# Patient Record
Sex: FEMALE | Race: WHITE | Employment: FULL TIME | URBAN - METROPOLITAN AREA
[De-identification: names, ages, dates, MRNs, and addresses within clinical notes are randomized per-mention and may not be internally consistent; named-entity substitution may affect disease eponyms.]

---

## 2019-04-08 NOTE — PROGRESS NOTES
Subjective:      Patient ID: Gerardo Tavares is a 26 y.o. female. HPI  NEW PATIENT VISIT:    Patient is here today to become established. She is feeling well and is fasting for labs. She complains of some pain in the epigastric area. It has only been present for the last couple days. She denies any heartburn or trouble swallowing. Allergies   Allergen Reactions    Nickel Rash     No current outpatient medications on file. No current facility-administered medications for this visit. Past Medical History:   Diagnosis Date    Ventricular septal defect     Compensated / She sees a Cardiologist in [facility] yearly. Past Surgical History:   Procedure Laterality Date    ADENOIDECTOMY      LASIK Bilateral      Social History     Tobacco Use    Smoking status: Former Smoker     Last attempt to quit:      Years since quittin.2    Smokeless tobacco: Never Used   Substance Use Topics    Alcohol use: Yes     Comment: Rare     Drug use: No     Family History   Problem Relation Age of Onset    Diabetes Mother     High Blood Pressure Mother     Heart Disease Maternal Aunt     Cancer Maternal Aunt     Depression Maternal Aunt         Schizophrenia         Review of Systems   Constitutional: Negative for chills and fever. HENT: Positive for sore throat. Negative for congestion and postnasal drip. Respiratory: Positive for cough. Negative for shortness of breath and wheezing. Cardiovascular: Negative for chest pain, palpitations and leg swelling. Gastrointestinal: Positive for abdominal pain. Negative for blood in stool, constipation, diarrhea, nausea and vomiting. Mild epigastric pain. Genitourinary: Negative for dysuria, frequency, hematuria and urgency. Psychiatric/Behavioral: Negative for dysphoric mood and suicidal ideas.      /80 (Cuff Size: Large Adult)   Ht 5' 4\" (1.626 m)   Wt 185 lb 9.6 oz (84.2 kg)   BMI 31.86 kg/m²    Objective:   Physical Exam

## 2019-04-12 ENCOUNTER — OFFICE VISIT (OUTPATIENT)
Dept: FAMILY MEDICINE CLINIC | Age: 34
End: 2019-04-12
Payer: COMMERCIAL

## 2019-04-12 VITALS
DIASTOLIC BLOOD PRESSURE: 80 MMHG | BODY MASS INDEX: 31.69 KG/M2 | HEIGHT: 64 IN | WEIGHT: 185.6 LBS | SYSTOLIC BLOOD PRESSURE: 120 MMHG

## 2019-04-12 DIAGNOSIS — Q21.0 VENTRICULAR SEPTAL DEFECT: ICD-10-CM

## 2019-04-12 DIAGNOSIS — Z00.00 PREVENTATIVE HEALTH CARE: ICD-10-CM

## 2019-04-12 DIAGNOSIS — Z00.00 WELL ADULT EXAM: Primary | ICD-10-CM

## 2019-04-12 LAB
ALT SERPL-CCNC: 13 U/L (ref 10–40)
ANION GAP SERPL CALCULATED.3IONS-SCNC: 13 MMOL/L (ref 3–16)
AST SERPL-CCNC: 15 U/L (ref 15–37)
BUN BLDV-MCNC: 9 MG/DL (ref 7–20)
CALCIUM SERPL-MCNC: 9.3 MG/DL (ref 8.3–10.6)
CHLORIDE BLD-SCNC: 103 MMOL/L (ref 99–110)
CHOLESTEROL, TOTAL: 149 MG/DL (ref 0–199)
CO2: 23 MMOL/L (ref 21–32)
CREAT SERPL-MCNC: 0.6 MG/DL (ref 0.6–1.1)
GFR AFRICAN AMERICAN: >60
GFR NON-AFRICAN AMERICAN: >60
GLUCOSE BLD-MCNC: 95 MG/DL (ref 70–99)
HCT VFR BLD CALC: 43.2 % (ref 36–48)
HDLC SERPL-MCNC: 64 MG/DL (ref 40–60)
HEMOGLOBIN: 14.3 G/DL (ref 12–16)
LDL CHOLESTEROL CALCULATED: 73 MG/DL
MCH RBC QN AUTO: 28.4 PG (ref 26–34)
MCHC RBC AUTO-ENTMCNC: 33.1 G/DL (ref 31–36)
MCV RBC AUTO: 85.6 FL (ref 80–100)
PDW BLD-RTO: 13.5 % (ref 12.4–15.4)
PLATELET # BLD: 334 K/UL (ref 135–450)
PMV BLD AUTO: 9.2 FL (ref 5–10.5)
POTASSIUM SERPL-SCNC: 4.7 MMOL/L (ref 3.5–5.1)
RBC # BLD: 5.05 M/UL (ref 4–5.2)
SODIUM BLD-SCNC: 139 MMOL/L (ref 136–145)
TRIGL SERPL-MCNC: 62 MG/DL (ref 0–150)
TSH SERPL DL<=0.05 MIU/L-ACNC: 1.61 UIU/ML (ref 0.27–4.2)
VLDLC SERPL CALC-MCNC: 12 MG/DL
WBC # BLD: 5.9 K/UL (ref 4–11)

## 2019-04-12 PROCEDURE — 36415 COLL VENOUS BLD VENIPUNCTURE: CPT | Performed by: FAMILY MEDICINE

## 2019-04-12 PROCEDURE — 99385 PREV VISIT NEW AGE 18-39: CPT | Performed by: FAMILY MEDICINE

## 2019-04-12 ASSESSMENT — ENCOUNTER SYMPTOMS
WHEEZING: 0
SHORTNESS OF BREATH: 0
DIARRHEA: 0
VOMITING: 0
ABDOMINAL PAIN: 1
BLOOD IN STOOL: 0
COUGH: 1
CONSTIPATION: 0
SORE THROAT: 1
NAUSEA: 0

## 2019-04-12 ASSESSMENT — PATIENT HEALTH QUESTIONNAIRE - PHQ9
SUM OF ALL RESPONSES TO PHQ QUESTIONS 1-9: 0
SUM OF ALL RESPONSES TO PHQ9 QUESTIONS 1 & 2: 0
2. FEELING DOWN, DEPRESSED OR HOPELESS: 0
1. LITTLE INTEREST OR PLEASURE IN DOING THINGS: 0
SUM OF ALL RESPONSES TO PHQ QUESTIONS 1-9: 0

## 2019-04-13 DIAGNOSIS — R73.9 HYPERGLYCEMIA: ICD-10-CM

## 2019-04-13 LAB
ESTIMATED AVERAGE GLUCOSE: 119.8 MG/DL
HBA1C MFR BLD: 5.8 %

## 2019-07-27 ENCOUNTER — HOSPITAL ENCOUNTER (EMERGENCY)
Age: 34
Discharge: HOME | End: 2019-07-27
Payer: COMMERCIAL

## 2019-07-27 VITALS
RESPIRATION RATE: 18 BRPM | BODY MASS INDEX: 31.76 KG/M2 | OXYGEN SATURATION: 98 % | SYSTOLIC BLOOD PRESSURE: 130 MMHG | TEMPERATURE: 97.6 F | WEIGHT: 185 LBS | DIASTOLIC BLOOD PRESSURE: 78 MMHG | HEART RATE: 75 BPM

## 2019-07-27 DIAGNOSIS — H10.31 ACUTE CONJUNCTIVITIS OF RIGHT EYE, UNSPECIFIED ACUTE CONJUNCTIVITIS TYPE: Primary | ICD-10-CM

## 2019-07-27 PROCEDURE — 99282 EMERGENCY DEPT VISIT SF MDM: CPT

## 2019-07-27 RX ORDER — OLOPATADINE HYDROCHLORIDE 2 MG/ML
1 SOLUTION/ DROPS OPHTHALMIC DAILY
Qty: 2.5 ML | Refills: 0 | Status: SHIPPED | OUTPATIENT
Start: 2019-07-27 | End: 2019-08-03

## 2019-07-27 RX ORDER — ERYTHROMYCIN 5 MG/G
OINTMENT OPHTHALMIC
Qty: 3.5 G | Refills: 0 | Status: SHIPPED | OUTPATIENT
Start: 2019-07-27 | End: 2019-08-06

## 2019-07-27 ASSESSMENT — ENCOUNTER SYMPTOMS
DIARRHEA: 0
SHORTNESS OF BREATH: 0
NAUSEA: 0
ABDOMINAL PAIN: 0
EYE PAIN: 0
BACK PAIN: 0
EYE DISCHARGE: 1
CHEST TIGHTNESS: 0
VOMITING: 0
PHOTOPHOBIA: 0
EYE ITCHING: 1
EYE REDNESS: 1

## 2019-07-27 ASSESSMENT — VISUAL ACUITY
OU: 20/20
OD: 20/20
OS: 20/25

## 2019-07-27 ASSESSMENT — PAIN DESCRIPTION - PAIN TYPE: TYPE: ACUTE PAIN

## 2019-07-27 ASSESSMENT — PAIN DESCRIPTION - LOCATION: LOCATION: EYE

## 2019-07-27 ASSESSMENT — PAIN DESCRIPTION - ORIENTATION: ORIENTATION: RIGHT

## 2019-07-27 NOTE — ED PROVIDER NOTES
treatment. PastMedical/Surgical History:      Diagnosis Date    Hyperglycemia     Ventricular septal defect     Compensated / She sees a Cardiologist in [facility] yearly. Procedure Laterality Date    ADENOIDECTOMY      LASIK Bilateral        Medications:  Previous Medications    No medications on file       Review of Systems:  Review of Systems   Constitutional: Negative for activity change, chills and fever. Eyes: Positive for discharge, redness and itching. Negative for photophobia, pain and visual disturbance. Respiratory: Negative for chest tightness and shortness of breath. Cardiovascular: Negative for chest pain. Gastrointestinal: Negative for abdominal pain, diarrhea, nausea and vomiting. Genitourinary: Negative for dysuria and flank pain. Musculoskeletal: Negative for back pain and myalgias. Skin: Negative for rash. Allergic/Immunologic: Positive for environmental allergies. Neurological: Negative for seizures, syncope and headaches. Positives and Pertinent negatives as per HPI. Except as noted above in the ROS, problem specific ROS was completed and is negative. Physical Exam:  Physical Exam   Constitutional: She is oriented to person, place, and time. She appears well-developed and well-nourished. She is active and cooperative. No distress. HENT:   Head: Normocephalic and atraumatic. Head is without raccoon's eyes and without Lance's sign. Right Ear: Hearing and external ear normal.   Left Ear: Hearing and external ear normal.   Nose: Nose normal.   Mouth/Throat: Oropharynx is clear and moist.   Eyes: Pupils are equal, round, and reactive to light. EOM are normal. Right eye exhibits chemosis. Right eye exhibits no discharge. Left eye exhibits no chemosis and no discharge. Right conjunctiva is injected. Right conjunctiva has no hemorrhage. Left conjunctiva is not injected. Left conjunctiva has no hemorrhage. No scleral icterus. Neck: Normal range of motion.  No JVD present. Cardiovascular: Normal rate and regular rhythm. Exam reveals no gallop and no friction rub. No murmur heard. Pulmonary/Chest: Effort normal and breath sounds normal. No accessory muscle usage. No respiratory distress. She has no wheezes. She has no rhonchi. She has no rales. Neurological: She is alert and oriented to person, place, and time. She has normal strength. No cranial nerve deficit or sensory deficit. Coordination normal. GCS eye subscore is 4. GCS verbal subscore is 5. GCS motor subscore is 6. Skin: Skin is warm and dry. She is not diaphoretic. Psychiatric: She has a normal mood and affect. Her behavior is normal.   Nursing note and vitals reviewed. MEDICAL DECISION MAKING    Vitals:    Vitals:    07/27/19 1913   BP: 130/78   Pulse: 75   Resp: 18   Temp: 97.6 °F (36.4 °C)   TempSrc: Infrared   SpO2: 98%   Weight: 185 lb (83.9 kg)       LABS:Labs Reviewed - No data to display     Remainder of labs reviewed and werenegative at this time or not returned at the time of this note. RADIOLOGY:   Non-plain film images such as CT, Ultrasound and MRI are read by the radiologist. Ruth Ann Romero PA-C have directly visualized the radiologic plain film image(s) with the below findings:      Interpretation per the Radiologist below, if available at the time of thisnote:    No orders to display        No results found. MEDICAL DECISION MAKING / ED COURSE:      PROCEDURES:   Procedures  None    Patient was given:  Medications - No data to display    The patient's detailed history of present illness is documented as above. Upon arrival to the emergency department the patient's vital signs are as documented. The patient is noted to be hemodynamically stable and afebrile. Physical examination findings are as above. Lengthy discussion took place with the patient as well as family members at bedside. There does appear to be somewhat mixed picture in regards to this.   There are definitely is an allergic component likely related to the cat but because of the associated ecchymosis and the associated injection will need to cover her also for a bacterial source. I believe that is relatively unlikely. I suggested initiation of treatment with erythromycin ophthalmic as well as Pataday. I suggested follow-up on an outpatient basis. The patient has been made aware of the signs and symptoms which would necessitate an immediate return to the emergency department and verbalizes an understanding of these signs and symptoms. I estimate there is a low risk of herpes keratitis or UV keratitis, corneal ulceration or angle closure glaucoma. There is no evidence of conjunctivitis, scleritis, or episcleritis. No vision threatening condition found such as penetrating globe injury, central retinal artery occlusion, rentinal vein thrombosis or retinal detachment. In light of a clinical presentation which lacks an opthamalogic emergency, the patient be managed on an outpatient basis. The patient/family and I have discussed the diagnosis and risks, and we agree with discharging home with referral to ophthalmology     The patient tolerated their visit well. I evaluated the patient. The physician was available for consultation as needed. The patient and / or the family were informed of the results of anytests, a time was given to answer questions, a plan was proposed and they agreed with plan. CLINICAL IMPRESSION:  1.  Acute conjunctivitis of right eye, unspecified acute conjunctivitis type        DISPOSITION Decision To Discharge 07/27/2019 07:22:22 PM      PATIENT REFERRED TO:  [facility], [address] [place] [address] Emergency Department  [address] [place] [address]  [phone]    If symptoms worsen      DISCHARGE

## 2019-07-27 NOTE — ED NOTES
Pt. discharged in stable position. Pt. provided with discharge instructions and prescriptions. Given opportunity to ask questions if needed and verbalized understanding. Pt. Ambulated to exit per self.         Rina Bah RN  07/27/19 4757

## 2019-07-30 ENCOUNTER — OFFICE VISIT (OUTPATIENT)
Dept: GYNECOLOGY | Age: 34
End: 2019-07-30
Payer: COMMERCIAL

## 2019-07-30 VITALS
WEIGHT: 171.6 LBS | OXYGEN SATURATION: 99 % | SYSTOLIC BLOOD PRESSURE: 126 MMHG | DIASTOLIC BLOOD PRESSURE: 89 MMHG | BODY MASS INDEX: 31.58 KG/M2 | HEIGHT: 62 IN | TEMPERATURE: 98.8 F | RESPIRATION RATE: 16 BRPM | HEART RATE: 74 BPM

## 2019-07-30 DIAGNOSIS — R10.2 PELVIC PAIN IN FEMALE: ICD-10-CM

## 2019-07-30 DIAGNOSIS — Z01.419 WELL WOMAN EXAM WITH ROUTINE GYNECOLOGICAL EXAM: Primary | ICD-10-CM

## 2019-07-30 PROCEDURE — 99385 PREV VISIT NEW AGE 18-39: CPT | Performed by: OBSTETRICS & GYNECOLOGY

## 2019-07-30 NOTE — PROGRESS NOTES
Subjective:      Patient ID: Mima Elmore is a 26 y.o. female. HPI   pts here for annual gyn exam.  She notes occas lower pelvic pain. Takes no meds. She's been in the [address] [place] [address],[address] for 2 years. Trying to conceive for 9 months. Review of Systems Pertinent review of systems items discussed above. All others systems items not discussed above were negative. Objective:   Physical Exam   Constitutional: She is oriented to person, place, and time. She appears well-developed and well-nourished. HENT:   Head: Normocephalic and atraumatic. Neck: No tracheal deviation present. No thyromegaly present. Cardiovascular: Normal rate, regular rhythm and normal heart sounds. No murmur heard. Pulmonary/Chest: Effort normal and breath sounds normal. No respiratory distress. She has no wheezes. She has no rales. Right breast exhibits no mass, no nipple discharge and no skin change. Left breast exhibits no mass, no nipple discharge and no skin change. No breast tenderness (no masses), discharge or bleeding. Abdominal: Soft. She exhibits no distension and no mass. There is no tenderness. There is no rebound. Genitourinary: Vagina normal and uterus normal. Rectal exam shows no external hemorrhoid. No breast tenderness (no masses), discharge or bleeding. There is no lesion on the right labia. There is no lesion on the left labia. Uterus is not deviated, not enlarged, not fixed and not tender. Cervix exhibits no motion tenderness, no discharge and no friability. Right adnexum displays no mass and no tenderness. Left adnexum displays no mass and no tenderness. No foreign body in the vagina. No vaginal discharge found. Genitourinary Comments: Pap performed. Musculoskeletal: Normal range of motion. Lymphadenopathy:     She has no cervical adenopathy. Neurological: She is alert and oriented to person, place, and time. dna  Assessment:      Normal gyn exam, pelvic pain      Plan:      Pelvic US.   Call with

## 2019-07-31 LAB
C TRACH DNA GENITAL QL NAA+PROBE: NEGATIVE
N. GONORRHOEAE DNA: NEGATIVE

## 2019-08-01 LAB
HPV COMMENT: NORMAL
HPV TYPE 16: NOT DETECTED
HPV TYPE 18: NOT DETECTED
HPVOH (OTHER TYPES): NOT DETECTED

## 2020-02-24 ENCOUNTER — TELEPHONE (OUTPATIENT)
Dept: FAMILY MEDICINE CLINIC | Age: 35
End: 2020-02-24

## 2020-02-24 NOTE — TELEPHONE ENCOUNTER
Yeimi Salazar is worried about his wife. States she had a little cough last night, but now her temp is 100.0F. States he believes she is about 5 weeks pregnant. States she doesn't see the OB doctor until beginning of March. States he is not sure if she needs to be seen or go to the ER. Please return call.

## 2020-02-25 ENCOUNTER — OFFICE VISIT (OUTPATIENT)
Dept: FAMILY MEDICINE CLINIC | Age: 35
End: 2020-02-25
Payer: COMMERCIAL

## 2020-02-25 VITALS — SYSTOLIC BLOOD PRESSURE: 120 MMHG | BODY MASS INDEX: 31.09 KG/M2 | DIASTOLIC BLOOD PRESSURE: 78 MMHG | WEIGHT: 170 LBS

## 2020-02-25 PROCEDURE — 99213 OFFICE O/P EST LOW 20 MIN: CPT | Performed by: FAMILY MEDICINE

## 2020-02-25 ASSESSMENT — ENCOUNTER SYMPTOMS
SHORTNESS OF BREATH: 0
WHEEZING: 0
COUGH: 1
SORE THROAT: 0
SINUS PRESSURE: 0
RHINORRHEA: 1
SINUS PAIN: 0

## 2020-02-25 ASSESSMENT — PATIENT HEALTH QUESTIONNAIRE - PHQ9
SUM OF ALL RESPONSES TO PHQ QUESTIONS 1-9: 0
1. LITTLE INTEREST OR PLEASURE IN DOING THINGS: 0
SUM OF ALL RESPONSES TO PHQ9 QUESTIONS 1 & 2: 0
SUM OF ALL RESPONSES TO PHQ QUESTIONS 1-9: 0
2. FEELING DOWN, DEPRESSED OR HOPELESS: 0

## 2020-08-31 ENCOUNTER — TELEPHONE (OUTPATIENT)
Dept: FAMILY MEDICINE CLINIC | Age: 35
End: 2020-08-31

## 2020-08-31 NOTE — TELEPHONE ENCOUNTER
Preferred Date Range: From 9/3/2020 To 9/26/2020     Preferred Times: Thursday Morning, Friday Morning, Thursday Afternoon, Friday Afternoon     Reason: To address the following health maintenance concerns. Flu Vaccine     Comments:  Regular health exam and yearly flu vaccine    I called and LVM for patient to call us back to schedule the appt she requested via [employer].

## 2020-09-23 NOTE — PROGRESS NOTES
well-developed. HENT:      Head: Normocephalic. Right Ear: External ear normal.      Left Ear: External ear normal.      Mouth/Throat:      Pharynx: No oropharyngeal exudate. Neck:      Thyroid: No thyromegaly. Vascular: No JVD. Cardiovascular:      Rate and Rhythm: Normal rate and regular rhythm. Heart sounds: Normal heart sounds. No murmur. Pulmonary:      Effort: Pulmonary effort is normal.      Breath sounds: Normal breath sounds. No wheezing or rales. Lymphadenopathy:      Cervical: No cervical adenopathy. Neurological:      Mental Status: She is alert and oriented to person, place, and time.          Assessment:      Well Adult Exam  Preventative Health Care      Plan:      CBC, Chem 7, TSH, Lipid Profile, ALT, AST, HgbA1C   Flu Shot Given  RTO as needed         Jarocho Martinez,

## 2020-09-24 ENCOUNTER — OFFICE VISIT (OUTPATIENT)
Dept: FAMILY MEDICINE CLINIC | Age: 35
End: 2020-09-24
Payer: COMMERCIAL

## 2020-09-24 VITALS
DIASTOLIC BLOOD PRESSURE: 78 MMHG | BODY MASS INDEX: 31.92 KG/M2 | SYSTOLIC BLOOD PRESSURE: 118 MMHG | TEMPERATURE: 98.1 F | WEIGHT: 187 LBS | HEIGHT: 64 IN

## 2020-09-24 LAB
ALT SERPL-CCNC: 14 U/L (ref 10–40)
ANION GAP SERPL CALCULATED.3IONS-SCNC: 12 MMOL/L (ref 3–16)
AST SERPL-CCNC: 19 U/L (ref 15–37)
BUN BLDV-MCNC: 11 MG/DL (ref 7–20)
CALCIUM SERPL-MCNC: 9.4 MG/DL (ref 8.3–10.6)
CHLORIDE BLD-SCNC: 103 MMOL/L (ref 99–110)
CHOLESTEROL, TOTAL: 148 MG/DL (ref 0–199)
CO2: 21 MMOL/L (ref 21–32)
CREAT SERPL-MCNC: 0.7 MG/DL (ref 0.6–1.1)
GFR AFRICAN AMERICAN: >60
GFR NON-AFRICAN AMERICAN: >60
GLUCOSE BLD-MCNC: 110 MG/DL (ref 70–99)
HCT VFR BLD CALC: 41.9 % (ref 36–48)
HDLC SERPL-MCNC: 57 MG/DL (ref 40–60)
HEMOGLOBIN: 13.7 G/DL (ref 12–16)
LDL CHOLESTEROL CALCULATED: 72 MG/DL
MCH RBC QN AUTO: 28.4 PG (ref 26–34)
MCHC RBC AUTO-ENTMCNC: 32.8 G/DL (ref 31–36)
MCV RBC AUTO: 86.7 FL (ref 80–100)
PDW BLD-RTO: 14 % (ref 12.4–15.4)
PLATELET # BLD: 303 K/UL (ref 135–450)
PMV BLD AUTO: 9.4 FL (ref 5–10.5)
POTASSIUM SERPL-SCNC: 3.9 MMOL/L (ref 3.5–5.1)
RBC # BLD: 4.83 M/UL (ref 4–5.2)
SODIUM BLD-SCNC: 136 MMOL/L (ref 136–145)
TRIGL SERPL-MCNC: 96 MG/DL (ref 0–150)
TSH SERPL DL<=0.05 MIU/L-ACNC: 2.43 UIU/ML (ref 0.27–4.2)
VLDLC SERPL CALC-MCNC: 19 MG/DL
WBC # BLD: 6.7 K/UL (ref 4–11)

## 2020-09-24 PROCEDURE — 36415 COLL VENOUS BLD VENIPUNCTURE: CPT | Performed by: FAMILY MEDICINE

## 2020-09-24 PROCEDURE — 90471 IMMUNIZATION ADMIN: CPT | Performed by: FAMILY MEDICINE

## 2020-09-24 PROCEDURE — 99395 PREV VISIT EST AGE 18-39: CPT | Performed by: FAMILY MEDICINE

## 2020-09-24 PROCEDURE — 90686 IIV4 VACC NO PRSV 0.5 ML IM: CPT | Performed by: FAMILY MEDICINE

## 2020-09-24 ASSESSMENT — ENCOUNTER SYMPTOMS
WHEEZING: 0
COUGH: 0
SORE THROAT: 0
CONSTIPATION: 0
SHORTNESS OF BREATH: 0
RHINORRHEA: 0
VOMITING: 0
NAUSEA: 0
DIARRHEA: 0
BLOOD IN STOOL: 0
ABDOMINAL PAIN: 0

## 2020-09-25 LAB
ESTIMATED AVERAGE GLUCOSE: 111.2 MG/DL
HBA1C MFR BLD: 5.5 %

## 2021-07-16 ENCOUNTER — OFFICE VISIT (OUTPATIENT)
Dept: PRIMARY CARE CLINIC | Age: 36
End: 2021-07-16
Payer: COMMERCIAL

## 2021-07-16 VITALS
OXYGEN SATURATION: 98 % | HEART RATE: 85 BPM | SYSTOLIC BLOOD PRESSURE: 104 MMHG | DIASTOLIC BLOOD PRESSURE: 74 MMHG | WEIGHT: 195 LBS | BODY MASS INDEX: 33.47 KG/M2

## 2021-07-16 DIAGNOSIS — D50.9 IRON DEFICIENCY ANEMIA, UNSPECIFIED IRON DEFICIENCY ANEMIA TYPE: Primary | ICD-10-CM

## 2021-07-16 DIAGNOSIS — Q21.0 VSD (VENTRICULAR SEPTAL DEFECT), SINGLE: ICD-10-CM

## 2021-07-16 DIAGNOSIS — Z00.00 ROUTINE ADULT HEALTH MAINTENANCE: ICD-10-CM

## 2021-07-16 PROCEDURE — 99203 OFFICE O/P NEW LOW 30 MIN: CPT | Performed by: INTERNAL MEDICINE

## 2021-07-16 RX ORDER — DOXYCYCLINE HYCLATE 50 MG/1
CAPSULE, GELATIN COATED ORAL
COMMUNITY
Start: 2021-06-19 | End: 2022-01-14

## 2021-07-16 SDOH — ECONOMIC STABILITY: FOOD INSECURITY: WITHIN THE PAST 12 MONTHS, YOU WORRIED THAT YOUR FOOD WOULD RUN OUT BEFORE YOU GOT MONEY TO BUY MORE.: NEVER TRUE

## 2021-07-16 SDOH — ECONOMIC STABILITY: FOOD INSECURITY: WITHIN THE PAST 12 MONTHS, THE FOOD YOU BOUGHT JUST DIDN'T LAST AND YOU DIDN'T HAVE MONEY TO GET MORE.: NEVER TRUE

## 2021-07-16 ASSESSMENT — PATIENT HEALTH QUESTIONNAIRE - PHQ9
SUM OF ALL RESPONSES TO PHQ QUESTIONS 1-9: 0
2. FEELING DOWN, DEPRESSED OR HOPELESS: 0
SUM OF ALL RESPONSES TO PHQ QUESTIONS 1-9: 0
SUM OF ALL RESPONSES TO PHQ QUESTIONS 1-9: 0
SUM OF ALL RESPONSES TO PHQ9 QUESTIONS 1 & 2: 0
1. LITTLE INTEREST OR PLEASURE IN DOING THINGS: 0

## 2021-07-16 ASSESSMENT — SOCIAL DETERMINANTS OF HEALTH (SDOH): HOW HARD IS IT FOR YOU TO PAY FOR THE VERY BASICS LIKE FOOD, HOUSING, MEDICAL CARE, AND HEATING?: NOT VERY HARD

## 2021-07-16 NOTE — PATIENT INSTRUCTIONS
Blood test  Biometrics  Ask your surgeon re lifting restrictions at this point  Will contact you w test results  Congenital heart disease clinic referral

## 2021-07-16 NOTE — PROGRESS NOTES
2021    Hay Dukes (:  1985) is a 38 y.o. female, here for evaluation of the following medical concerns:    Chief Complaint   Patient presents with    Establish Care     biometric screen refer to cardiologist for heart defect       HPI  68-year-old [facility] female with unrepaired VSD evidently seen cardiology in [facility] annually, hyperglycemia but A1c 5.5%, former smoker previously followed by Dr. Phuong Moreno last seen 2020 with excellent laboratories at that time without evidence of diabetes liver thyroid kidney disease anemia; excellent lipid profile. Prior Pap smear Dr. Mansfield Eng 5494 including HPV screen negative. No cardiology evaluation in the system, last seen in [facility] 4 years ago prior to moving to the [address] [place] [address] Rd[address]. Review of Systems   Constitutional: Negative for activity change, appetite change, fatigue and unexpected weight change. HENT: Negative for dental problem, sinus pain, sore throat and trouble swallowing. Eyes: Negative for pain and visual disturbance. Respiratory: Negative for apnea, cough, chest tightness, shortness of breath and wheezing. Cardiovascular: Negative for chest pain and palpitations. Gastrointestinal: Negative for abdominal pain, blood in stool, constipation, diarrhea, nausea, rectal pain and vomiting. Endocrine: Negative for cold intolerance, heat intolerance, polydipsia, polyphagia and polyuria. Genitourinary: Negative for difficulty urinating, dysuria, flank pain, frequency, hematuria, pelvic pain, urgency, vaginal bleeding and vaginal discharge. Musculoskeletal: Negative for arthralgias, back pain, gait problem, joint swelling, myalgias, neck pain and neck stiffness. Skin: Negative for color change and rash. Neurological: Negative for dizziness, tremors, syncope, speech difficulty, weakness, light-headedness and headaches. Hematological: Negative for adenopathy. Does not bruise/bleed easily.    Psychiatric/Behavioral: Negative for agitation, behavioral problems, decreased concentration, sleep disturbance and suicidal ideas. The patient is not nervous/anxious and is not hyperactive. Prior to Visit Medications    Medication Sig Taking? Authorizing Provider   ferrous gluconate (FERGON) 324 (38 Fe) MG tablet TAKE 1 TABLET BY MOUTH EVERY DAY  Historical Provider, MD        Allergies   Allergen Reactions    Ciprofloxacin Rash    Nickel Rash       Past Medical History:   Diagnosis Date    Hyperglycemia     Ventricular septal defect     Compensated / She sees a Cardiologist in [facility] yearly. Past Surgical History:   Procedure Laterality Date    ADENOIDECTOMY      LASIK Bilateral        Social History     Socioeconomic History    Marital status:      Spouse name: Not on file    Number of children: 0    Years of education: Not on file    Highest education level: Not on file   Occupational History    Occupation:     Tobacco Use    Smoking status: Former Smoker     Quit date:      Years since quittin.5    Smokeless tobacco: Never Used   Vaping Use    Vaping Use: Never used   Substance and Sexual Activity    Alcohol use: Yes     Comment: Rare     Drug use: No    Sexual activity: Not on file   Other Topics Concern    Not on file   Social History Narrative    Not on file     Social Determinants of Health     Financial Resource Strain: Low Risk     Difficulty of Paying Living Expenses: Not very hard   Food Insecurity: No Food Insecurity    Worried About Running Out of Food in the Last Year: Never true    [place] of Food in the Last Year: Never true   Transportation Needs:     Lack of Transportation (Medical):      Lack of Transportation (Non-Medical):    Physical Activity:     Days of Exercise per Week:     Minutes of Exercise per Session:    Stress:     Feeling of Stress :    Social Connections:     Frequency of Communication with Friends and Family:     Frequency of Social Gatherings with Friends and Family:     Attends [employer] Services:     Active Member of Clubs or Organizations:     Attends Club or Organization Meetings:     Marital Status:    Intimate Partner Violence:     Fear of Current or Ex-Partner:     Emotionally Abused:     Physically Abused:     Sexually Abused:         Family History   Problem Relation Age of Onset    Diabetes Mother     High Blood Pressure Mother     Heart Disease Maternal Aunt     Cancer Maternal Aunt         Breast    Depression Maternal Aunt         Schizophrenia     Diabetes Maternal Aunt     Stroke Maternal Aunt        Vitals:    07/16/21 1343   BP: 104/74   Pulse: 85   SpO2: 98%   Weight: 195 lb (88.5 kg)     Estimated body mass index is 33.47 kg/m² as calculated from the following:    Height as of 9/24/20: 5' 4\" (1.626 m). Weight as of this encounter: 195 lb (88.5 kg). PHYSICAL EXAM  GENERAL:  Pleasant mildly obese pale  female who looks her stated age, awake alert and oriented x3, no acute distress. She speaks [place] well. HEENT:  Normocephalic atraumatic. Pupils equal round reactive light and accommodation, extra ocular muscles are intact. Oropharynx is clear moist without injection or exudate. Tongue and palate move normally. Turbinates appear normal.  Tympanic membranes appear normal.  NECK:  Supple nontender. No carotid bruits. Brisk carotid upstrokes, no JVD. No thyromegaly. LYMPH:  No supraclavicular cervical axillary or inguinal lymphadenopathy. LUNGS:  Clear to auscultation bilaterally. Excellent air entry. No inspiratory crackles or expiratory wheezes. HEART:  Regular rate and rhythm with systolic ejection murmur with augmentation with Valsalva, best heard at the right upper sternal border, no rub gallop S3 or S4. ABDOMEN:  Soft, nontender, obese. Transverse incision, well-healed with minimal scale. No fluctuance or palpable subcutaneous seroma. Pfannenstiel normal bowel sounds. No guarding.   No previously 11's earlier in hospitalization, 13.7 last year baseline. Patient wonders if she can reassure herself with a iron level and hemoglobin level now understanding that it takes some time to build up counts. - CBC; Future  - Ferritin; Future    2. Routine adult health maintenance  Up-to-date with Tdap HPV/Pap [facility]. Has a gynecologist.  - Hep C AB RLFX HCV PCR-A; Future  - HIV Screen; Future    3. VSD (ventricular septal defect), single  Historically medically managed. Patient is sure that this is an isolated VSD. Patient endorses exertional dyspnea, mild, able to climb 2 flights of stairs. No history of reactive airway disease, non-smoker. She does not feel this is markedly worse despite postop anemia. Denies PND orthopnea exertional presyncope currently. Has not seen cardiology since [facility] 4 years ago, when she  and moved to the [address] [place] [address],[address]. Exam does not support flow reversal right to left shunt concerns. - External Referral To Cardiology    4. postoperative restrictions. Still having some mild left lower quadrant pain particularly with standing asked to contact her surgeon regarding lifting restrictions in view of her upcoming job at the [employer]. Return in about 1 year (around 7/16/2022). It was a pleasure to visit with Ms. Rojelio Solano today. Answered all questions as best I could.   Saskia Villegas MD   Time 28 minutes

## 2022-01-14 ENCOUNTER — OFFICE VISIT (OUTPATIENT)
Dept: PRIMARY CARE CLINIC | Age: 37
End: 2022-01-14
Payer: COMMERCIAL

## 2022-01-14 VITALS
BODY MASS INDEX: 33.67 KG/M2 | WEIGHT: 197.2 LBS | DIASTOLIC BLOOD PRESSURE: 70 MMHG | SYSTOLIC BLOOD PRESSURE: 106 MMHG | HEIGHT: 64 IN | OXYGEN SATURATION: 98 % | TEMPERATURE: 97.4 F | RESPIRATION RATE: 18 BRPM | HEART RATE: 90 BPM

## 2022-01-14 DIAGNOSIS — Z86.16 PERSONAL HISTORY OF COVID-19: Primary | ICD-10-CM

## 2022-01-14 PROCEDURE — 99213 OFFICE O/P EST LOW 20 MIN: CPT | Performed by: INTERNAL MEDICINE

## 2022-01-14 NOTE — LETTER
January 14, 2022    Re: [address]   [address] [place] [address] [address] 1985    To Whom It May Concern:  Ms Karely Trujillo is a patient under my care at [facility] in [address] [place] [address]. She was diagnosed with mild SARS-COVID-19 by nasal swab PCR on 12/3/2021 despite have completed primary vaccination series Crow Peter 3/19/21 and 4/9/21. Her mild symptoms resolved within one week. She has been afebrile since that time. At time of exam today, she shows no evidence of active infection, and has had Crow Peter booster #1 on 12/28/21. In my medical opinion, she poses no risk of COVID transmission in excess of the general fully vaccination population. Please do not hesitate to contact my office with any questions or concerns.      Yours truly,      Fanta Dunbar MD  [facility]  [address] [place], [address]  [phone]

## 2022-01-14 NOTE — PROGRESS NOTES
2022    Rusty Paula (:  1985) is a 43 y.o. female, here for evaluation of the following medical concerns:    Chief Complaint   Patient presents with    Other     letter       HPI  55-year-old [place] female with unrepaired VSD evidently seen cardiology in [facility] annually, hyperglycemia but A1c 5.5%, former smoker previously followed by Dr. Trivedi Client last seen 2020 with excellent laboratories at that time without evidence of diabetes liver thyroid kidney disease anemia; excellent lipid profile. Prior Pap smear Dr. Corrina Gitelman [id number] including HPV screen negative. No cardiology evaluation in the system when we first met the patient, last seen in [facility] 4 years ago prior to moving to the [address] [place] [address],[address]. Referred to congenital heart clinic at [facility] which showed only a small VSD left to right flow. Review of Systems   Constitutional: Negative for activity change, appetite change, fatigue and unexpected weight change. HENT: Negative for dental problem, sinus pain, sore throat and trouble swallowing. Eyes: Negative for pain and visual disturbance. Respiratory: Negative for apnea, cough, chest tightness, shortness of breath and wheezing. Cardiovascular: Negative for chest pain and palpitations. Gastrointestinal: Negative for abdominal pain, blood in stool, constipation, diarrhea, nausea, rectal pain and vomiting. Endocrine: Negative for cold intolerance, heat intolerance, polydipsia, polyphagia and polyuria. Genitourinary: Negative for difficulty urinating, dysuria, flank pain, frequency, hematuria, pelvic pain, urgency, vaginal bleeding and vaginal discharge. Musculoskeletal: Negative for arthralgias, back pain, gait problem, joint swelling, myalgias, neck pain and neck stiffness. Skin: Negative for color change and rash. Neurological: Negative for dizziness, tremors, syncope, speech difficulty, weakness, light-headedness and headaches.    Hematological: Negative for adenopathy. Does not bruise/bleed easily. Psychiatric/Behavioral: Negative for agitation, behavioral problems, decreased concentration, sleep disturbance and suicidal ideas. The patient is not nervous/anxious and is not hyperactive. Prior to Visit Medications    Medication Sig Taking? Authorizing Provider   ferrous gluconate (FERGON) 324 (38 Fe) MG tablet TAKE 1 TABLET BY MOUTH EVERY DAY  Historical Provider, MD        Allergies   Allergen Reactions    Latex     Ciprofloxacin Rash    Nickel Rash       Past Medical History:   Diagnosis Date    Hyperglycemia     Ventricular septal defect     Compensated / She sees a Cardiologist in [facility] yearly. Past Surgical History:   Procedure Laterality Date    ADENOIDECTOMY      LASIK Bilateral        Social History     Socioeconomic History    Marital status:      Spouse name: Not on file    Number of children: 0    Years of education: Not on file    Highest education level: Not on file   Occupational History    Occupation:     Tobacco Use    Smoking status: Former Smoker     Quit date:      Years since quittin.0    Smokeless tobacco: Never Used   Vaping Use    Vaping Use: Never used   Substance and Sexual Activity    Alcohol use: Yes     Comment: Rare     Drug use: No    Sexual activity: Not on file   Other Topics Concern    Not on file   Social History Narrative    Not on file     Social Determinants of Health     Financial Resource Strain: Low Risk     Difficulty of Paying Living Expenses: Not very hard   Food Insecurity: No Food Insecurity    Worried About Running Out of Food in the Last Year: Never true    [place] of Food in the Last Year: Never true   Transportation Needs:     Lack of Transportation (Medical): Not on file    Lack of Transportation (Non-Medical):  Not on file   Physical Activity:     Days of Exercise per Week: Not on file    Minutes of Exercise per Session: Not on file   Stress:     Feeling of Stress : Not on file   Social Connections:     Frequency of Communication with Friends and Family: Not on file    Frequency of Social Gatherings with Friends and Family: Not on file    Attends Judaism Services: Not on file    Active Member of Clubs or Organizations: Not on file    Attends Club or Organization Meetings: Not on file    Marital Status: Not on file   Intimate Partner Violence:     Fear of Current or Ex-Partner: Not on file    Emotionally Abused: Not on file    Physically Abused: Not on file    Sexually Abused: Not on file   Housing Stability:     Unable to Pay for Housing in the Last Year: Not on file    Number of [place] in the Last Year: Not on file    Unstable Housing in the Last Year: Not on file        Family History   Problem Relation Age of Onset    Diabetes Mother     High Blood Pressure Mother     Heart Disease Maternal Aunt     Cancer Maternal Aunt         Breast    Depression Maternal Aunt         Schizophrenia     Diabetes Maternal Aunt     Stroke Maternal Aunt        Vitals:    01/14/22 1128   BP: 106/70   Site: Left Upper Arm   Position: Sitting   Cuff Size: Large Adult   Pulse: 90   Resp: 18   Temp: 97.4 °F (36.3 °C)   TempSrc: Temporal   SpO2: 98%   Weight: 197 lb 3.2 oz (89.4 kg)   Height: 5' 4\" (1.626 m)     Estimated body mass index is 33.85 kg/m² as calculated from the following:    Height as of this encounter: 5' 4\" (1.626 m). Weight as of this encounter: 197 lb 3.2 oz (89.4 kg). PHYSICAL EXAM  GENERAL:  Pleasant mildly obese pregnant pale  female who looks her stated age, awake alert and oriented x3, no acute distress. She speaks [place] well. HEENT:  Normocephalic atraumatic. Pupils equal round reactive light and accommodation, extra ocular muscles are intact. Oropharynx is clear moist without injection or exudate. Tongue and palate move normally.   Turbinates appear normal.  Tympanic membranes appear normal.  NECK:  Supple nontender. No carotid bruits. Brisk carotid upstrokes, no JVD. No thyromegaly. LYMPH:  No supraclavicular cervical axillary or inguinal lymphadenopathy. LUNGS:  Clear to auscultation bilaterally. Excellent air entry. No inspiratory crackles or expiratory wheezes. HEART:  Regular rate and rhythm with systolic ejection murmur with augmentation with Valsalva, best heard at the right upper sternal border, no rub gallop S3 or S4. ABDOMEN:  Soft, nontender, obese. Transverse incision, well-healed with minimal scale. Fundus at level of umbilicus. UROGENITAL:  Deferred  EXTREMITIES:  Warm and well perfused without clubbing cyanosis or edema. 2+ pulses in all 4 extremities. Capillary refill less than 2 seconds. NEURO:  Cranial nerves 2-12 grossly intact. Normal muscle bulk and tone. No resting tremor, cogwheeling, normal rapid alternating movements in the hands and feet. No stocking paresthesia. Normal gait and station. MUSCULOSKELETAL: No active synovitis or osteoarthritic changes. SKIN:  No worrisome lesions, skin a little dry. PSYCH:  No psychomotor retardation or agitation. Good eye contact. Unrestricted affect range. Mood congruent with affect. Linear thought. LABS  Lab Review   No visits with results within 14 Month(s) from this visit.    Latest known visit with results is:   Office Visit on 09/24/2020   Component Date Value    WBC 09/24/2020 6.7     RBC 09/24/2020 4.83     Hemoglobin 09/24/2020 13.7     Hematocrit 09/24/2020 41.9     MCV 09/24/2020 86.7     MCH 09/24/2020 28.4     MCHC 09/24/2020 32.8     RDW 09/24/2020 14.0     Platelets 56/68/4557 303     MPV 09/24/2020 9.4     Sodium 09/24/2020 136     Potassium 09/24/2020 3.9     Chloride 09/24/2020 103     CO2 09/24/2020 21     Anion Gap 09/24/2020 12     Glucose 09/24/2020 110*    BUN 09/24/2020 11     CREATININE 09/24/2020 0.7     GFR Non- 09/24/2020 >60     GFR  09/24/2020 >60     Calcium 09/24/2020 9.4     TSH 09/24/2020 2.43     Cholesterol, Total 09/24/2020 148     Triglycerides 09/24/2020 96     HDL 09/24/2020 57     LDL Calculated 09/24/2020 72     VLDL Cholesterol Calcula* 09/24/2020 19     ALT 09/24/2020 14     AST 09/24/2020 19     Hemoglobin A1C 09/24/2020 5.5     eAG 09/24/2020 111.2          ASSESSMENT/PLAN  1. Iron deficiency anemia, unspecified iron deficiency anemia type  Postoperative anemia following emergency left salpingectomy for ectopic pregnancy June 2021, patient states received 5 units of blood perioperatively, though I am able to see 3 with hemoglobin dropping on last draw into the nines previously 11's earlier in hospitalization, 13.7 last year baseline. Patient wonders if she can reassure herself with a iron level and hemoglobin level now understanding that it takes some time to build up counts. - CBC; Future  - Ferritin; Future    2. Routine adult health maintenance  Up-to-date with Tdap HPV/Pap [facility]. Has a gynecologist.  - Hep C AB RLFX HCV PCR-A; Future  - HIV Screen; Future    3. VSD (ventricular septal defect), single, small  Historically medically managed. Patient is sure that this is an isolated VSD. Patient endorses exertional dyspnea, mild, able to climb 2 flights of stairs. No history of reactive airway disease, non-smoker. She does not feel this is markedly worse despite postop anemia. Denies PND orthopnea exertional presyncope currently. Has not seen cardiology since [facility] 4 years ago, when she  and moved to the [place]. Exam does not support flow reversal right to left shunt concerns. - External Referral To Cardiology    4. Break Through mild COVID infection, resolved. She was diagnosed with mild SARS-COVID-19 by nasal swab PCR on 12/3/2021 despite have completed primary vaccination series [place] 3/19/21 and 4/9/21. Her mild symptoms resolved within one week. She has been afebrile since that time.      At time of exam today, she shows no evidence of active infection, and has had [place] booster #1 on 12/28/21. In my medical opinion, she poses no risk of COVID transmission in excess of the general fully vaccination population. She is safe to travel. No follow-ups on file. It was a pleasure to visit with Ms. Salinas Batsheva today. Answered all questions as best I could.   Ludy Manning MD   Time20 minutes

## 2022-01-14 NOTE — LETTER
[address] [facility]  [address] [facility] [address] [id number]  Phone: [phone]  Fax: [phone]     Letha Geiger MD           January 14, 2022      Patient: Keith Palmer   YOB: 1985   Date of Visit: 1/14/2022      To Whom It May Concern:  Ms Keith Palmer is a patient under my care at [facility] in [address] [place] [address].     She was diagnosed with mild SARS-COVID-19 by nasal swab PCR on 12/3/2021 despite have completed primary vaccination series [place] 3/19/21 and 4/9/21. Her mild symptoms resolved within one week. She has been afebrile since that time.      At time of exam today, she shows no evidence of active infection, and has had [place] booster #1 on 12/28/21.     In my medical opinion, she poses no risk of COVID transmission in excess of the general fully vaccination population.  She is cleared for travel.      Please do not hesitate to contact my office with any questions or concerns.      Yours truly,        Letha Geiger MD  [facility]  [address] [place], [address]  [phone]

## 2022-01-14 NOTE — LETTER
[facility]  [id number]  Phone: [phone]  Fax: [phone]    Tate Estrada MD        January 14, 2022     Patient: Connor Mendez   YOB: 1985   Date of Visit: 1/14/2022     To Whom It May Concern:  Ms Connor Mendez is a patient under my care at [facility] in [address] [place] [address]. She was diagnosed with mild SARS-COVID-19 by nasal swab PCR on 12/3/2021 despite have completed primary vaccination series [place] 3/19/21 and 4/9/21. Her mild symptoms resolved within one week. She has been afebrile since that time. At time of exam today, she shows no evidence of active infection, and has had [place] booster #1 on 12/28/21. In my medical opinion, she poses no risk of COVID transmission in excess of the general fully vaccination population. She is cleared for travel. Please do not hesitate to contact my office with any questions or concerns.      Yours truly,      Tate Estrada MD  [facility]  [address] [place], [address]  [phone]

## 2023-08-23 NOTE — Clinical Note
[facility]  [address] [address]  Phone: [phone]  Fax: [phone]    Isaias Carvajal MD        January 14, 2022     Patient: Zee Byrne   YOB: 1985   Date of Visit: 1/14/2022       To Whom It May Concern: It is my medical opinion that Zee Byrne {Work release (duty restriction):[id number]}. If you have any questions or concerns, please don't hesitate to call.     Sincerely,        Isaias Carvajal MD
K on admission 3.5 likely 2/2 to hx of vomiting.   - 40 mEq potassium PO   - LR maintenance   - Repeat BMP 10 PM